# Patient Record
Sex: FEMALE | Race: WHITE | NOT HISPANIC OR LATINO | URBAN - METROPOLITAN AREA
[De-identification: names, ages, dates, MRNs, and addresses within clinical notes are randomized per-mention and may not be internally consistent; named-entity substitution may affect disease eponyms.]

---

## 2019-01-15 ENCOUNTER — OFFICE (OUTPATIENT)
Dept: URBAN - METROPOLITAN AREA CLINIC 78 | Facility: CLINIC | Age: 69
End: 2019-01-15
Payer: COMMERCIAL

## 2019-01-15 VITALS
HEIGHT: 68 IN | SYSTOLIC BLOOD PRESSURE: 114 MMHG | WEIGHT: 189 LBS | HEART RATE: 67 BPM | DIASTOLIC BLOOD PRESSURE: 96 MMHG | TEMPERATURE: 97.5 F

## 2019-01-15 DIAGNOSIS — R63.4 ABNORMAL WEIGHT LOSS: ICD-10-CM

## 2019-01-15 DIAGNOSIS — R19.7 DIARRHEA, UNSPECIFIED: ICD-10-CM

## 2019-01-15 PROCEDURE — 99203 OFFICE O/P NEW LOW 30 MIN: CPT

## 2019-01-15 NOTE — SERVICEHPINOTES
ELLEN AREVALO   is a   69  female who presents with diarrhea. Had stool studies and CT of abdomen and pelvis which were unremarkable. Diarrhea started December 1st. Went to urgent care with congestion. Took Sudafed. Started having sudden explosive diarrhea. Can have diarrhea up to 8 times daily with urgency and had accidents as well. Denies blood in stool, melena or abdominal pain. +Bad odor. + Mucus. loss of 15 lbs since November. Denies nausea, vomiting, dysphagia, acid reflux or dyspepsia. Not able to eat due to lost appetite.BR+ nocturnal diarrhea. Had accident in the bed. Reports a history of MS. Bates by pcp and prescribed with Flagyl, Levaquin for 7 days and probiotics. BRDiarrhea has improved, had a bowel movement three times a day, loose/soft on BSS type 5-6. But yesterday, started again. Took Imodium, Pepto-Bismol. No diarrhea since yesterday. Under a lot of stress, not sure it attributes to diarrhea. BRDaughter has crohn's disease. Last colonoscopy was done in 05/2014 which showed diverticulosis and internal hemorrhoids.

## 2019-02-14 ENCOUNTER — AMBULATORY SURGICAL CENTER (OUTPATIENT)
Dept: URBAN - METROPOLITAN AREA SURGERY 21 | Facility: SURGERY | Age: 69
End: 2019-02-14
Payer: COMMERCIAL

## 2019-02-14 DIAGNOSIS — K56.2 VOLVULUS: ICD-10-CM

## 2019-02-14 DIAGNOSIS — R19.4 CHANGE IN BOWEL HABIT: ICD-10-CM

## 2019-02-14 PROCEDURE — 45380 COLONOSCOPY AND BIOPSY: CPT

## 2019-03-12 ENCOUNTER — OFFICE (OUTPATIENT)
Dept: URBAN - METROPOLITAN AREA CLINIC 78 | Facility: CLINIC | Age: 69
End: 2019-03-12
Payer: COMMERCIAL

## 2019-03-12 VITALS
TEMPERATURE: 97.4 F | WEIGHT: 187 LBS | HEART RATE: 71 BPM | SYSTOLIC BLOOD PRESSURE: 111 MMHG | HEIGHT: 68 IN | DIASTOLIC BLOOD PRESSURE: 74 MMHG

## 2019-03-12 DIAGNOSIS — R19.7 DIARRHEA, UNSPECIFIED: ICD-10-CM

## 2019-03-12 DIAGNOSIS — R63.4 ABNORMAL WEIGHT LOSS: ICD-10-CM

## 2019-03-12 PROCEDURE — 99214 OFFICE O/P EST MOD 30 MIN: CPT

## 2019-03-12 NOTE — SERVICEHPINOTES
ELLEN AREVALO   is a   69  female who presents for follow up. Had a colonoscopy on 02/14/19 with indication of diarrhea and it was unremarkable including random colon biopsies. The celiac panel and TSH in January were also WNL. Started taking Metamucil wafer, 2 wafers once daily since the colonoscopy. The diarrhea has improved. Now moves bowel once daily on BSS type 3-4. Denies blood in stool, melena or abdominal pain. Denies further weight loss. However still has lack of appetite, not hungry. Occasional nausea without vomiting. Not sure if can pinpoint certain foods that aggravate it. The nausea seems to gets worse at night. Still on bland diet. Not sure what happens if eats regular foods. BRHad blood work by pcp yesterday. Denies dysphagia, acid reflux or dyspepsia. Reports being under a lot of stress with family issue. Recently stared taking Lexapro again.   BRCT of abdomen and pelvis in 12/2018 showed moderate hiatal hernia, no mass as pertains to GI. EGD 10/13 showed a schatzki's ring s/p dilation, a hiatal hernia, a sessile polyp in the fundus otherwise unremarkable. FONT style="BACKGROUND-COLOR: #ffffcc" visited="true"BR/FONT

## 2022-07-19 ENCOUNTER — OFFICE (OUTPATIENT)
Dept: URBAN - METROPOLITAN AREA CLINIC 79 | Facility: CLINIC | Age: 72
End: 2022-07-19
Payer: COMMERCIAL

## 2022-07-19 VITALS — HEIGHT: 68 IN | WEIGHT: 185 LBS

## 2022-07-19 DIAGNOSIS — R93.5 ABNORMAL FINDINGS ON DIAGNOSTIC IMAGING OF OTHER ABDOMINAL R: ICD-10-CM

## 2022-07-19 DIAGNOSIS — K21.00 GASTRO-ESOPHAGEAL REFLUX DISEASE WITH ESOPHAGITIS, WITHOUT B: ICD-10-CM

## 2022-07-19 PROCEDURE — 99204 OFFICE O/P NEW MOD 45 MIN: CPT | Performed by: INTERNAL MEDICINE

## 2022-07-19 NOTE — SERVICEHPINOTES
ELLEN AREVALO   is a   72   year old    female who is being seen in consultation at the request of   JAZMIN DEMPSEY   for epigastric pain that started 1 week ago, became severe so went to the ER on 7/14.  Noted pain was worse with deep breaths and when laying down.  Has been taking Protonix for 1 year for heartburn symtoms, and has been controlling her symptoms.  Denies nsaid use.  Noted to have elevated wbc at 13 and slightly low hgb at 11 mcv normal at 86.  Normal chm 7, CT angio of chest neg for pulm embolism.  Large paraesophageal hernia on the lst side of the stomach with thickening of the esophagus.  Says she feels a sense of early satiety and has only been drinking ensure, but does say she was able to eat mashed potatoes a few days ago   br
dion She has an EGD on 2013 where a 5 cm hiatal hernia was found.  No christensen's esphogus changes noted on that EGD.dion ashley

## 2022-07-20 ENCOUNTER — AMBULATORY SURGICAL CENTER (OUTPATIENT)
Dept: URBAN - METROPOLITAN AREA SURGERY 23 | Facility: SURGERY | Age: 72
End: 2022-07-20
Payer: COMMERCIAL

## 2022-07-20 DIAGNOSIS — K31.7 POLYP OF STOMACH AND DUODENUM: ICD-10-CM

## 2022-07-20 DIAGNOSIS — R10.13 EPIGASTRIC PAIN: ICD-10-CM

## 2022-07-20 PROCEDURE — 43239 EGD BIOPSY SINGLE/MULTIPLE: CPT | Performed by: INTERNAL MEDICINE

## 2022-09-06 ENCOUNTER — OFFICE (OUTPATIENT)
Dept: URBAN - METROPOLITAN AREA TELEHEALTH 3 | Facility: TELEHEALTH | Age: 72
End: 2022-09-06
Payer: COMMERCIAL

## 2022-09-06 VITALS — WEIGHT: 175 LBS | HEIGHT: 68 IN

## 2022-09-06 DIAGNOSIS — K21.00 GASTRO-ESOPHAGEAL REFLUX DISEASE WITH ESOPHAGITIS, WITHOUT B: ICD-10-CM

## 2022-09-06 DIAGNOSIS — K44.9 DIAPHRAGMATIC HERNIA WITHOUT OBSTRUCTION OR GANGRENE: ICD-10-CM

## 2022-09-06 PROCEDURE — 99213 OFFICE O/P EST LOW 20 MIN: CPT | Performed by: INTERNAL MEDICINE

## 2022-09-06 RX ORDER — PANTOPRAZOLE SODIUM 40 MG/1
TABLET, DELAYED RELEASE ORAL
Qty: 0 | Refills: 0 | Status: COMPLETED
End: 2022-09-06

## 2022-09-06 NOTE — SERVICENOTES
Patient's visit was conducted through phone communication. Patient consented before the start of visit as to understanding of privacy concerns, possible technological failure, and their responsibility of carrying out instructions of plan.  Provider was located in their home during this visit.

## 2022-09-06 NOTE — SERVICEHPINOTES
PATIENT VERIFIED BY DATE OF BIRTH AND NAME. Patient has been consented for this telecommunication visit. Underwent surgical repair of paraesophageal hernia on 8/12.  Says she feels much better.  No longer having any chest pain or upper abdominal pain.  Has returned back to normal appetitie.  She has been taking pantoprazole since she has returned home from the surgery.  Has been regaining some weight.  No dysphagia, nausea, emesis, heartburn, melena.  Normal brown bowel movements.
br
br Otherwise 10 pt review of systems is neg

## 2023-11-21 ENCOUNTER — TELEHEALTH PROVIDED OTHER THAN IN PATIENT'S HOME (OUTPATIENT)
Dept: URBAN - METROPOLITAN AREA TELEHEALTH 7 | Facility: TELEHEALTH | Age: 73
End: 2023-11-21
Payer: COMMERCIAL

## 2023-11-21 VITALS — HEIGHT: 68 IN | WEIGHT: 180 LBS

## 2023-11-21 DIAGNOSIS — R13.10 DYSPHAGIA, UNSPECIFIED: ICD-10-CM

## 2023-11-21 DIAGNOSIS — R68.81 EARLY SATIETY: ICD-10-CM

## 2023-11-21 DIAGNOSIS — K59.09 OTHER CONSTIPATION: ICD-10-CM

## 2023-11-21 PROCEDURE — 99214 OFFICE O/P EST MOD 30 MIN: CPT | Mod: 95 | Performed by: INTERNAL MEDICINE

## 2023-11-21 NOTE — SERVICENOTES
Patient's visit was conducted through [employer] video telecommunication. Patient consented before the start of visit as to understanding of privacy concerns, possible technological failure, and their responsibility of carrying out instructions of plan.

## 2023-11-21 NOTE — SERVICEHPINOTES
PATIENT VERIFIED BY DATE OF BIRTH AND NAME. Patient has been consented for this telecommunication visit. Underwent surgical repair of paraesophageal hernia on 8/2022.  Overt the past 2-3 months, she has been noticing solid and liquid dysphagia with a feeling of slowness with swallowing, with food sticking in her throat and causing some vomiting.  She  has lost about 7 pounds over the past six weeks.   Over the same time period, she has also noticed more fulllness in her upper and lower abdomen with without eating.  She started Omeprazole 1 week ago which has helped the upper abdominal fullness somewhat.   She has been more constipated lately, and so she has been taking Miralax as needed.  She had a recent CT scan of the chest 2-3 weeks ago at [facility] which showed a small hernia that was not concerning.    Otherwise 10 pt review of systems is neg

## 2023-11-30 ENCOUNTER — AMBULATORY SURGICAL CENTER (OUTPATIENT)
Dept: URBAN - METROPOLITAN AREA SURGERY 23 | Facility: SURGERY | Age: 73
End: 2023-11-30
Payer: COMMERCIAL

## 2023-11-30 DIAGNOSIS — K44.9 DIAPHRAGMATIC HERNIA WITHOUT OBSTRUCTION OR GANGRENE: ICD-10-CM

## 2023-11-30 DIAGNOSIS — K31.7 POLYP OF STOMACH AND DUODENUM: ICD-10-CM

## 2023-11-30 DIAGNOSIS — R13.10 DYSPHAGIA, UNSPECIFIED: ICD-10-CM

## 2023-11-30 DIAGNOSIS — K22.2 ESOPHAGEAL OBSTRUCTION: ICD-10-CM

## 2023-11-30 PROCEDURE — 43235 EGD DIAGNOSTIC BRUSH WASH: CPT | Performed by: INTERNAL MEDICINE

## 2023-11-30 PROCEDURE — 43450 DILATE ESOPHAGUS 1/MULT PASS: CPT | Mod: 59 | Performed by: INTERNAL MEDICINE

## 2025-03-13 ENCOUNTER — TELEHEALTH PROVIDED IN PATIENT'S HOME (OUTPATIENT)
Dept: URBAN - METROPOLITAN AREA TELEHEALTH 7 | Facility: TELEHEALTH | Age: 75
End: 2025-03-13
Payer: MEDICARE

## 2025-03-13 VITALS — WEIGHT: 185 LBS | HEIGHT: 68 IN

## 2025-03-13 DIAGNOSIS — G35 MULTIPLE SCLEROSIS: ICD-10-CM

## 2025-03-13 DIAGNOSIS — K59.09 OTHER CONSTIPATION: ICD-10-CM

## 2025-03-13 DIAGNOSIS — R11.10 VOMITING, UNSPECIFIED: ICD-10-CM

## 2025-03-13 DIAGNOSIS — K22.2 ESOPHAGEAL OBSTRUCTION: ICD-10-CM

## 2025-03-13 DIAGNOSIS — Z98.890 OTHER SPECIFIED POSTPROCEDURAL STATES: ICD-10-CM

## 2025-03-13 DIAGNOSIS — R68.81 EARLY SATIETY: ICD-10-CM

## 2025-03-13 PROCEDURE — 99214 OFFICE O/P EST MOD 30 MIN: CPT | Mod: 95 | Performed by: PHYSICIAN ASSISTANT

## 2025-03-13 NOTE — SERVICENOTES
Patient was located in their home during visit., Patient's visit was conducted through [employer] video telecommunication. Patient consented before the start of visit as to understanding of privacy concerns, possible technological failure, and their responsibility of carrying out instructions of plan., I spent 25 minutes today reviewing the chart, talking with the patient, reviewing previous results with patient, discussing plan, and documenting. Patient understands and agrees with plan. Questions/concerns addressed.

## 2025-03-13 NOTE — SERVICEHPINOTES
PATIENT VERIFIED BY DATE OF BIRTH AND NAME. Patient has been consented for this telecommunication visit using [employer] application.
dion ashley 76 yo female presents with vomiting. She has PMH M.S., GERD (on PPI BID since seen in November 2023) Underwent surgical repair of paraesophageal hernia on 8/2022. Last seen in November 2023 and was having dysphagia and vomiting at that time. Had EGD 11/30/2023 with dilation of Schatzki's ring and 2 cm hiatal hernia noted.  dion ashleyIn the past week, has been having post-prandial vomiting after lunch and dinner most days. She is only able to eat about half of what she was able to eat previously. Not really able to say yes or no to dysphagia symptom. Shortly after eating, she gets some nausea and throws up her food. Has ondansetron on hand so has been trying that. She also has been on pantoprazole 40 mg and has been taking this BID apparently since last seen in late 2023. dion
dion Denies any pain. With her M.S., she often only has a BM about every 4-5 days and uses Miralax on occasion. Reports being less constipated recently.    dion
dion Has variable strength and can have challenges with balance and walking at times. 
dion ashley Denies heart disease. dion ashley ROS as above, otherwise negative. dion

## 2025-03-14 ENCOUNTER — ON CAMPUS - OUTPATIENT (OUTPATIENT)
Dept: URBAN - METROPOLITAN AREA HOSPITAL 65 | Facility: HOSPITAL | Age: 75
End: 2025-03-14
Payer: MEDICARE

## 2025-03-14 DIAGNOSIS — K22.2 ESOPHAGEAL OBSTRUCTION: ICD-10-CM

## 2025-03-14 DIAGNOSIS — R68.81 EARLY SATIETY: ICD-10-CM

## 2025-03-14 DIAGNOSIS — K31.7 POLYP OF STOMACH AND DUODENUM: ICD-10-CM

## 2025-03-14 DIAGNOSIS — K29.50 UNSPECIFIED CHRONIC GASTRITIS WITHOUT BLEEDING: ICD-10-CM

## 2025-03-14 DIAGNOSIS — K44.9 DIAPHRAGMATIC HERNIA WITHOUT OBSTRUCTION OR GANGRENE: ICD-10-CM

## 2025-03-14 DIAGNOSIS — R11.10 VOMITING, UNSPECIFIED: ICD-10-CM

## 2025-03-14 PROCEDURE — 43239 EGD BIOPSY SINGLE/MULTIPLE: CPT | Mod: 59 | Performed by: HOSPITALIST

## 2025-03-14 PROCEDURE — 43249 ESOPH EGD DILATION <30 MM: CPT | Performed by: HOSPITALIST
